# Patient Record
Sex: FEMALE | Race: BLACK OR AFRICAN AMERICAN | NOT HISPANIC OR LATINO | URBAN - METROPOLITAN AREA
[De-identification: names, ages, dates, MRNs, and addresses within clinical notes are randomized per-mention and may not be internally consistent; named-entity substitution may affect disease eponyms.]

---

## 2023-06-09 LAB — URINE CULTURE: NORMAL

## 2023-06-13 LAB
CHLAMYDIA TRACH., AMPLIFIED: NEGATIVE
N. GONORRHEA, AMPLIFIED: NEGATIVE

## 2023-06-23 LAB
HIV 1/ 2 AG/AB SCREEN: NONREACTIVE
SYPHILIS TOTAL AB: NONREACTIVE

## 2023-12-05 ENCOUNTER — OFFICE VISIT (OUTPATIENT)
Dept: OBSTETRICS AND GYNECOLOGY | Facility: CLINIC | Age: 33
End: 2023-12-05
Payer: COMMERCIAL

## 2023-12-05 ENCOUNTER — LAB (OUTPATIENT)
Dept: LAB | Facility: LAB | Age: 33
End: 2023-12-05
Payer: COMMERCIAL

## 2023-12-05 VITALS
HEIGHT: 59 IN | HEART RATE: 86 BPM | WEIGHT: 213.9 LBS | BODY MASS INDEX: 43.12 KG/M2 | DIASTOLIC BLOOD PRESSURE: 82 MMHG | SYSTOLIC BLOOD PRESSURE: 130 MMHG

## 2023-12-05 DIAGNOSIS — D06.9 CIN III (CERVICAL INTRAEPITHELIAL NEOPLASIA III): Primary | ICD-10-CM

## 2023-12-05 DIAGNOSIS — Z30.015 ENCOUNTER FOR INITIAL PRESCRIPTION OF VAGINAL RING HORMONAL CONTRACEPTIVE: ICD-10-CM

## 2023-12-05 DIAGNOSIS — R30.0 DYSURIA: ICD-10-CM

## 2023-12-05 DIAGNOSIS — Z11.3 ROUTINE SCREENING FOR STI (SEXUALLY TRANSMITTED INFECTION): ICD-10-CM

## 2023-12-05 DIAGNOSIS — R31.9 HEMATURIA, UNSPECIFIED TYPE: ICD-10-CM

## 2023-12-05 DIAGNOSIS — N88.9 LESION OF CERVIX: ICD-10-CM

## 2023-12-05 LAB
HBV SURFACE AG SERPL QL IA: NONREACTIVE
HCV AB SER QL: NONREACTIVE
HIV 1+2 AB+HIV1 P24 AG SERPL QL IA: NONREACTIVE
POC APPEARANCE, URINE: CLEAR
POC BILIRUBIN, URINE: NEGATIVE
POC BLOOD, URINE: ABNORMAL
POC COLOR, URINE: ABNORMAL
POC GLUCOSE, URINE: NEGATIVE MG/DL
POC KETONES, URINE: NEGATIVE MG/DL
POC LEUKOCYTES, URINE: ABNORMAL
POC NITRITE,URINE: NEGATIVE
POC PH, URINE: 6 PH
POC PROTEIN, URINE: ABNORMAL MG/DL
POC SPECIFIC GRAVITY, URINE: >=1.03
POC UROBILINOGEN, URINE: 1 EU/DL
T PALLIDUM AB SER QL: NONREACTIVE

## 2023-12-05 PROCEDURE — 87529 HSV DNA AMP PROBE: CPT | Performed by: NURSE PRACTITIONER

## 2023-12-05 PROCEDURE — 81003 URINALYSIS AUTO W/O SCOPE: CPT | Performed by: NURSE PRACTITIONER

## 2023-12-05 PROCEDURE — 87086 URINE CULTURE/COLONY COUNT: CPT | Performed by: NURSE PRACTITIONER

## 2023-12-05 PROCEDURE — 87661 TRICHOMONAS VAGINALIS AMPLIF: CPT | Mod: 59 | Performed by: NURSE PRACTITIONER

## 2023-12-05 PROCEDURE — 99214 OFFICE O/P EST MOD 30 MIN: CPT | Performed by: NURSE PRACTITIONER

## 2023-12-05 PROCEDURE — 87340 HEPATITIS B SURFACE AG IA: CPT

## 2023-12-05 PROCEDURE — 86803 HEPATITIS C AB TEST: CPT

## 2023-12-05 PROCEDURE — 1036F TOBACCO NON-USER: CPT | Performed by: NURSE PRACTITIONER

## 2023-12-05 PROCEDURE — 36415 COLL VENOUS BLD VENIPUNCTURE: CPT

## 2023-12-05 PROCEDURE — 86780 TREPONEMA PALLIDUM: CPT

## 2023-12-05 PROCEDURE — 88175 CYTOPATH C/V AUTO FLUID REDO: CPT | Mod: TC,GCY | Performed by: NURSE PRACTITIONER

## 2023-12-05 PROCEDURE — 87800 DETECT AGNT MULT DNA DIREC: CPT | Performed by: NURSE PRACTITIONER

## 2023-12-05 PROCEDURE — 87624 HPV HI-RISK TYP POOLED RSLT: CPT | Mod: 59 | Performed by: NURSE PRACTITIONER

## 2023-12-05 PROCEDURE — 87389 HIV-1 AG W/HIV-1&-2 AB AG IA: CPT

## 2023-12-05 RX ORDER — PHENTERMINE HYDROCHLORIDE 37.5 MG/1
37.5 TABLET ORAL DAILY
COMMUNITY
Start: 2023-09-21 | End: 2024-03-11 | Stop reason: WASHOUT

## 2023-12-05 RX ORDER — ETONOGESTREL AND ETHINYL ESTRADIOL VAGINAL RING .015; .12 MG/D; MG/D
1 RING VAGINAL
Qty: 3 EACH | Refills: 3 | Status: SHIPPED | OUTPATIENT
Start: 2023-12-05 | End: 2024-06-05 | Stop reason: SDUPTHER

## 2023-12-05 ASSESSMENT — ENCOUNTER SYMPTOMS
GASTROINTESTINAL NEGATIVE: 1
ENDOCRINE NEGATIVE: 0
ALLERGIC/IMMUNOLOGIC NEGATIVE: 1
HEMATOLOGIC/LYMPHATIC NEGATIVE: 0
RESPIRATORY NEGATIVE: 0
LOSS OF SENSATION IN FEET: 0
DEPRESSION: 0
PSYCHIATRIC NEGATIVE: 0
NEUROLOGICAL NEGATIVE: 0
CONSTITUTIONAL NEGATIVE: 0
MUSCULOSKELETAL NEGATIVE: 0
OCCASIONAL FEELINGS OF UNSTEADINESS: 0
EYES NEGATIVE: 0
CARDIOVASCULAR NEGATIVE: 0

## 2023-12-05 ASSESSMENT — COLUMBIA-SUICIDE SEVERITY RATING SCALE - C-SSRS
2. HAVE YOU ACTUALLY HAD ANY THOUGHTS OF KILLING YOURSELF?: NO
1. IN THE PAST MONTH, HAVE YOU WISHED YOU WERE DEAD OR WISHED YOU COULD GO TO SLEEP AND NOT WAKE UP?: NO
6. HAVE YOU EVER DONE ANYTHING, STARTED TO DO ANYTHING, OR PREPARED TO DO ANYTHING TO END YOUR LIFE?: NO

## 2023-12-05 ASSESSMENT — PATIENT HEALTH QUESTIONNAIRE - PHQ9
1. LITTLE INTEREST OR PLEASURE IN DOING THINGS: NOT AT ALL
2. FEELING DOWN, DEPRESSED OR HOPELESS: NOT AT ALL
SUM OF ALL RESPONSES TO PHQ9 QUESTIONS 1 AND 2: 0

## 2023-12-05 ASSESSMENT — PAIN SCALES - GENERAL: PAINLEVEL: 0-NO PAIN

## 2023-12-05 NOTE — PROGRESS NOTES
Lorraine is a 34yo here today for 6 month follow up after LEEP    BHUPINDER 3    She is also having multiple other issues  Feels like she has a UTI, some pain  Would like STI testing    Physical Exam  Constitutional:       Appearance: Normal appearance. She is obese.   Genitourinary:      Genitourinary Comments: Multiple shallow ulcers on cervix      Vulva exam comments: Normal.      Cervical lesion present.         Neurological:      Mental Status: She is alert.   Psychiatric:         Mood and Affect: Mood normal.         Behavior: Behavior normal.         Thought Content: Thought content normal.         Judgment: Judgment normal.          Diagnoses and all orders for this visit:  BHUPINDER III (cervical intraepithelial neoplasia III)  -     THINPREP PAP  Lesion of cervix  -     HSV PCR, Skin/Mucosa  Dysuria  -     POC Urine Dip  Encounter for initial prescription of vaginal ring hormonal contraceptive  -     etonogestreL-ethinyl estradioL (Nuvaring) 0.12-0.015 mg/24 hr vaginal ring; Insert 1 each into the vagina every 28 (twenty-eight) days. Insert vaginal ring for 3 weeks, then remove for 1 week.  Hematuria, unspecified type  -     Urine culture  Routine screening for STI (sexually transmitted infection)  -     Hepatitis B Surface Antigen; Future  -     Hepatitis C Antibody; Future  -     HIV 1/2 Antigen/Antibody Screen with Reflex to Confirmation; Future  -     Syphilis Screen with Reflex; Future

## 2023-12-06 LAB
HSV1 DNA SKIN QL NAA+PROBE: NOT DETECTED
HSV2 DNA SKIN QL NAA+PROBE: NOT DETECTED

## 2023-12-07 DIAGNOSIS — N30.01 ACUTE CYSTITIS WITH HEMATURIA: Primary | ICD-10-CM

## 2023-12-07 LAB
C TRACH RRNA SPEC QL NAA+PROBE: NEGATIVE
N GONORRHOEA DNA SPEC QL PROBE+SIG AMP: NEGATIVE
T VAGINALIS RRNA SPEC QL NAA+PROBE: NEGATIVE

## 2023-12-07 RX ORDER — NITROFURANTOIN 25; 75 MG/1; MG/1
100 CAPSULE ORAL 2 TIMES DAILY
Qty: 14 CAPSULE | Refills: 0 | Status: SHIPPED | OUTPATIENT
Start: 2023-12-07 | End: 2023-12-14

## 2023-12-08 LAB — BACTERIA UR CULT: ABNORMAL

## 2023-12-20 LAB
CYTOLOGY CMNT CVX/VAG CYTO-IMP: NORMAL
HPV HR 12 DNA GENITAL QL NAA+PROBE: POSITIVE
HPV HR GENOTYPES PNL CVX NAA+PROBE: POSITIVE
HPV16 DNA SPEC QL NAA+PROBE: NEGATIVE
HPV18 DNA SPEC QL NAA+PROBE: NEGATIVE
LAB AP HPV GENOTYPE QUESTION: YES
LAB AP HPV HR: NORMAL
LAB AP PAP ADDITIONAL TESTS: NORMAL
LAB AP PREVIOUS ABNORMAL HISTORY: NORMAL
LAB AP TREATMENT HISTORY: NORMAL
LABORATORY COMMENT REPORT: NORMAL
LMP START DATE: NORMAL
PATH REPORT.TOTAL CANCER: NORMAL

## 2024-03-11 ENCOUNTER — OFFICE VISIT (OUTPATIENT)
Dept: OBSTETRICS AND GYNECOLOGY | Facility: CLINIC | Age: 34
End: 2024-03-11
Payer: COMMERCIAL

## 2024-03-11 VITALS
HEIGHT: 59 IN | BODY MASS INDEX: 43.67 KG/M2 | DIASTOLIC BLOOD PRESSURE: 86 MMHG | HEART RATE: 87 BPM | WEIGHT: 216.6 LBS | SYSTOLIC BLOOD PRESSURE: 137 MMHG

## 2024-03-11 DIAGNOSIS — R39.9 UTI SYMPTOMS: Primary | ICD-10-CM

## 2024-03-11 LAB
POC APPEARANCE, URINE: CLEAR
POC BILIRUBIN, URINE: NEGATIVE
POC BLOOD, URINE: ABNORMAL
POC COLOR, URINE: YELLOW
POC GLUCOSE, URINE: NEGATIVE MG/DL
POC KETONES, URINE: NEGATIVE MG/DL
POC LEUKOCYTES, URINE: NEGATIVE
POC NITRITE,URINE: NEGATIVE
POC PH, URINE: 5.5 PH
POC PROTEIN, URINE: NEGATIVE MG/DL
POC SPECIFIC GRAVITY, URINE: >=1.03
POC UROBILINOGEN, URINE: 0.2 EU/DL

## 2024-03-11 PROCEDURE — 99213 OFFICE O/P EST LOW 20 MIN: CPT | Performed by: OBSTETRICS & GYNECOLOGY

## 2024-03-11 PROCEDURE — 81003 URINALYSIS AUTO W/O SCOPE: CPT | Performed by: OBSTETRICS & GYNECOLOGY

## 2024-03-11 PROCEDURE — 87086 URINE CULTURE/COLONY COUNT: CPT | Performed by: OBSTETRICS & GYNECOLOGY

## 2024-03-11 PROCEDURE — 1036F TOBACCO NON-USER: CPT | Performed by: OBSTETRICS & GYNECOLOGY

## 2024-03-11 PROCEDURE — 99213 OFFICE O/P EST LOW 20 MIN: CPT | Mod: TH | Performed by: OBSTETRICS & GYNECOLOGY

## 2024-03-11 RX ORDER — CEPHALEXIN 500 MG/1
500 CAPSULE ORAL 2 TIMES DAILY
Qty: 14 CAPSULE | Refills: 0 | Status: SHIPPED | OUTPATIENT
Start: 2024-03-11 | End: 2024-03-18

## 2024-03-11 ASSESSMENT — ENCOUNTER SYMPTOMS
CONSTITUTIONAL NEGATIVE: 1
GASTROINTESTINAL NEGATIVE: 1

## 2024-03-11 ASSESSMENT — PAIN SCALES - GENERAL: PAINLEVEL: 0-NO PAIN

## 2024-03-11 NOTE — PROGRESS NOTES
"Assessment   UA in office indicative of UTI (2+ blood).  Urine culture with susceptibility ordered.  Pt will complete Augmention for throat sx but if it is the same organism as from Dec 2023, it may not respond to Augmentin.  Anticipate she will have better relief with Keflex (sent empirically)  Pt to fu in 2024 for repeat cotest with ECC for LEEP with positive margins.    Ora Correa MD    Subjective   32 YO  here for UTI sx since end of February.  Feels like she has to pee all the time and it's just a little each time.  Urine is dark and smells.   No dysuria.  She has been on Augmentin since 3/6 for a sore throat (empiric tx for strep). No difference to her sx.    Pt feels like it feels similar to the UTI she had in December (amp resistant Klebsiella).  She tried Macrobid but it made her feel very sick so she didn't finish.  She did feel that sx improved for a short time and didn't recur until February.    Pt also due for repeat Pap with cotest in 2024 as she had LEEP 2023 with positive margins in 2023.    Objective   /86   Pulse 87   Ht 1.499 m (4' 11\")   Wt 98.2 kg (216 lb 9.6 oz)   LMP 2024 (Exact Date)   BMI 43.75 kg/m²      General:   Alert and oriented, in no acute distress   Neck: Supple. No visible thyromegaly.    Psych Normal affect. Normal mood.      UA performed today in office.  "

## 2024-03-13 LAB — BACTERIA UR CULT: NORMAL

## 2024-06-05 ENCOUNTER — OFFICE VISIT (OUTPATIENT)
Dept: OBSTETRICS AND GYNECOLOGY | Facility: CLINIC | Age: 34
End: 2024-06-05
Payer: COMMERCIAL

## 2024-06-05 VITALS
HEART RATE: 81 BPM | WEIGHT: 212.4 LBS | SYSTOLIC BLOOD PRESSURE: 129 MMHG | DIASTOLIC BLOOD PRESSURE: 84 MMHG | HEIGHT: 60 IN | BODY MASS INDEX: 41.7 KG/M2

## 2024-06-05 DIAGNOSIS — N87.9 CERVICAL DYSPLASIA: ICD-10-CM

## 2024-06-05 DIAGNOSIS — Z01.411 ENCOUNTER FOR WELL WOMAN EXAM WITH ABNORMAL FINDINGS: Primary | ICD-10-CM

## 2024-06-05 DIAGNOSIS — Z13.1 DIABETES MELLITUS SCREENING: ICD-10-CM

## 2024-06-05 DIAGNOSIS — Z30.015 ENCOUNTER FOR INITIAL PRESCRIPTION OF VAGINAL RING HORMONAL CONTRACEPTIVE: ICD-10-CM

## 2024-06-05 DIAGNOSIS — Z11.3 ROUTINE SCREENING FOR STI (SEXUALLY TRANSMITTED INFECTION): ICD-10-CM

## 2024-06-05 DIAGNOSIS — Z31.62 ENCOUNTER FOR FERTILITY PRESERVATION COUNSELING: ICD-10-CM

## 2024-06-05 PROCEDURE — 87661 TRICHOMONAS VAGINALIS AMPLIF: CPT | Performed by: OBSTETRICS & GYNECOLOGY

## 2024-06-05 PROCEDURE — 99395 PREV VISIT EST AGE 18-39: CPT | Performed by: OBSTETRICS & GYNECOLOGY

## 2024-06-05 PROCEDURE — 1036F TOBACCO NON-USER: CPT | Performed by: OBSTETRICS & GYNECOLOGY

## 2024-06-05 PROCEDURE — 87491 CHLMYD TRACH DNA AMP PROBE: CPT | Performed by: OBSTETRICS & GYNECOLOGY

## 2024-06-05 RX ORDER — ETONOGESTREL AND ETHINYL ESTRADIOL VAGINAL RING .015; .12 MG/D; MG/D
RING VAGINAL
Qty: 4 EACH | Refills: 4 | Status: SHIPPED | OUTPATIENT
Start: 2024-06-05

## 2024-06-05 ASSESSMENT — ENCOUNTER SYMPTOMS
CARDIOVASCULAR NEGATIVE: 0
GASTROINTESTINAL NEGATIVE: 0
MUSCULOSKELETAL NEGATIVE: 0
EYES NEGATIVE: 0
PSYCHIATRIC NEGATIVE: 0
DEPRESSION: 0
CONSTITUTIONAL NEGATIVE: 0
RESPIRATORY NEGATIVE: 0
NEUROLOGICAL NEGATIVE: 0
ENDOCRINE NEGATIVE: 0
HEMATOLOGIC/LYMPHATIC NEGATIVE: 0
ALLERGIC/IMMUNOLOGIC NEGATIVE: 0
OCCASIONAL FEELINGS OF UNSTEADINESS: 0
LOSS OF SENSATION IN FEET: 0

## 2024-06-05 ASSESSMENT — COLUMBIA-SUICIDE SEVERITY RATING SCALE - C-SSRS
6. HAVE YOU EVER DONE ANYTHING, STARTED TO DO ANYTHING, OR PREPARED TO DO ANYTHING TO END YOUR LIFE?: NO
1. IN THE PAST MONTH, HAVE YOU WISHED YOU WERE DEAD OR WISHED YOU COULD GO TO SLEEP AND NOT WAKE UP?: NO
2. HAVE YOU ACTUALLY HAD ANY THOUGHTS OF KILLING YOURSELF?: NO

## 2024-06-05 ASSESSMENT — PAIN SCALES - GENERAL: PAINLEVEL: 0-NO PAIN

## 2024-06-05 ASSESSMENT — PATIENT HEALTH QUESTIONNAIRE - PHQ9
2. FEELING DOWN, DEPRESSED OR HOPELESS: NOT AT ALL
SUM OF ALL RESPONSES TO PHQ9 QUESTIONS 1 AND 2: 0
1. LITTLE INTEREST OR PLEASURE IN DOING THINGS: NOT AT ALL

## 2024-06-05 NOTE — PROGRESS NOTES
Assessment   PLAN  Thank you for coming to your annual exam. We discussed healthy lifestyle, well woman screening, and other diagnoses listed below.    Lorraine was seen today for annual exam.  Diagnoses and all orders for this visit:  Encounter for fertility preservation counseling (Primary)  -     Referral to Reproductive Endocrinology; Future  Encounter for initial prescription of vaginal ring hormonal contraceptive  -     etonogestreL-ethinyl estradioL (Nuvaring) 0.12-0.015 mg/24 hr vaginal ring; Insert vaginal ring every 3 weeks to suppress menstruation.  Cervical dysplasia  -     THINPREP PAP  Diabetes mellitus screening  -     Hemoglobin A1C; Future  Routine screening for STI (sexually transmitted infection)  -     Hepatitis B Surface Antigen; Future  -     Hepatitis C Antibody; Future  -     HIV 1/2 Antigen/Antibody Screen with Reflex to Confirmation; Future  -     Syphilis Screen with Reflex; Future    Please return for your next visit in 1 year.    Ora Correa MD    Subjective     Lorraine Patterson is a 34 y.o. female who is here for a routine exam.   PCP = Breana Shah MD    APE Concerns: none    Other Concerns:   Interested in menstrual suppression  FU cervical dysplasia  Interested in discussing fertility preservation as she does want to have children in the future. Has been with current partner x 2 months and he has a genetic disorder.    ObHx:   17-21w loss, s/o cervical insufficiency  TAB x 1     GynHx:  Menarche: 11  Menstrual Pattern: reg menses without menorrhagia or dysmenorrhea   STIs: hx remote GC/CT/trich, desires screening  Sexual Activity: men, monogamous, no complaints  Contraception: Nuvaring cyclically + condoms    Pap Hx:  2023 - LEEP BHUPINDER 3 with positive margins 6-9 o'clock  Dec 2023 - neg cytology, +HRHPV  Repeat cotest now    Preventative:  Last mammogram: due age 39  Last Colonoscopy: due age 44  DM Screening: elevated gluc on CMP in 2024, recommend A1C  now  DEXA: due age 64  HPV vaccination: received  Exercise: 2-3 times a week  Diet: no restrictions  Seat Belt Use: always    SoHx:  Living Situation: lives independently  School/Employment: behavioral health specialist at [employer]  Substance: No T/D. EtOH social.  Abuse: denies  Depression Screen: negative    Past Medical History:   Diagnosis Date    Cervical dysplasia     cin3 sp LEEP June 2023    Seasonal allergies     Situational anxiety     related to 2nd trimester loss, sees therapist     Past Surgical History:   Procedure Laterality Date    CERVICAL BIOPSY  W/ LOOP ELECTRODE EXCISION  06/2023    CIN3     No family history on file.    Objective   /84   Pulse 81   Ht 1.524 m (5')   Wt 96.3 kg (212 lb 6.4 oz)   LMP 05/08/2024 (Exact Date)   BMI 41.48 kg/m²      General:   Alert and oriented, in no acute distress   Neck: Supple. No visible thyromegaly.    Breast/Axilla: Normal to palpation bilaterally without masses, skin changes, or nipple discharge.    Abdomen: Soft, non-tender, without masses or organomegaly   Vulva: Normal architecture without erythema, masses, or lesions.    Vagina: Normal mucosa without lesions, masses, or atrophy. No abnormal vaginal discharge.    Cervix: Normal without masses, lesions, or signs of cervicitis.    Uterus: Normal mobile, non-enlarged uterus    Adnexa: Normal without masses or lesions   Pelvic Floor No POP noted. No high tone pelvic floor    Psych Normal affect. Normal mood.

## 2024-06-12 ENCOUNTER — APPOINTMENT (OUTPATIENT)
Dept: OBSTETRICS AND GYNECOLOGY | Facility: CLINIC | Age: 34
End: 2024-06-12
Payer: COMMERCIAL

## 2024-06-17 ENCOUNTER — APPOINTMENT (OUTPATIENT)
Dept: OBSTETRICS AND GYNECOLOGY | Facility: CLINIC | Age: 34
End: 2024-06-17
Payer: COMMERCIAL

## 2024-06-18 LAB
CYTOLOGY CMNT CVX/VAG CYTO-IMP: NORMAL
HPV HR 12 DNA GENITAL QL NAA+PROBE: POSITIVE
HPV HR GENOTYPES PNL CVX NAA+PROBE: POSITIVE
HPV16 DNA SPEC QL NAA+PROBE: NEGATIVE
HPV18 DNA SPEC QL NAA+PROBE: NEGATIVE
LAB AP HPV GENOTYPE QUESTION: YES
LAB AP HPV HR: NORMAL
LAB AP PAP ADDITIONAL TESTS: NORMAL
LABORATORY COMMENT REPORT: NORMAL
LMP START DATE: NORMAL
PATH REPORT.TOTAL CANCER: NORMAL

## 2024-06-24 ENCOUNTER — LAB (OUTPATIENT)
Dept: LAB | Facility: LAB | Age: 34
End: 2024-06-24
Payer: COMMERCIAL

## 2024-06-24 DIAGNOSIS — Z11.3 ROUTINE SCREENING FOR STI (SEXUALLY TRANSMITTED INFECTION): ICD-10-CM

## 2024-06-24 DIAGNOSIS — Z13.1 DIABETES MELLITUS SCREENING: ICD-10-CM

## 2024-06-24 LAB
EST. AVERAGE GLUCOSE BLD GHB EST-MCNC: 111 MG/DL
HBA1C MFR BLD: 5.5 %
HBV SURFACE AG SERPL QL IA: NONREACTIVE
HCV AB SER QL: NONREACTIVE
HIV 1+2 AB+HIV1 P24 AG SERPL QL IA: NONREACTIVE
TREPONEMA PALLIDUM IGG+IGM AB [PRESENCE] IN SERUM OR PLASMA BY IMMUNOASSAY: NONREACTIVE

## 2024-06-24 PROCEDURE — 87340 HEPATITIS B SURFACE AG IA: CPT

## 2024-06-24 PROCEDURE — 86780 TREPONEMA PALLIDUM: CPT

## 2024-06-24 PROCEDURE — 83036 HEMOGLOBIN GLYCOSYLATED A1C: CPT

## 2024-06-24 PROCEDURE — 87389 HIV-1 AG W/HIV-1&-2 AB AG IA: CPT

## 2024-06-24 PROCEDURE — 86803 HEPATITIS C AB TEST: CPT

## 2024-06-24 PROCEDURE — 36415 COLL VENOUS BLD VENIPUNCTURE: CPT

## 2024-08-21 ENCOUNTER — PROCEDURE VISIT (OUTPATIENT)
Dept: OBSTETRICS AND GYNECOLOGY | Facility: CLINIC | Age: 34
End: 2024-08-21
Payer: COMMERCIAL

## 2024-08-21 VITALS
SYSTOLIC BLOOD PRESSURE: 138 MMHG | BODY MASS INDEX: 39.34 KG/M2 | HEIGHT: 60 IN | DIASTOLIC BLOOD PRESSURE: 86 MMHG | HEART RATE: 86 BPM | WEIGHT: 200.4 LBS

## 2024-08-21 DIAGNOSIS — R87.810 CERVICAL HIGH RISK HPV (HUMAN PAPILLOMAVIRUS) TEST POSITIVE: Primary | ICD-10-CM

## 2024-08-21 LAB — PREGNANCY TEST URINE, POC: NEGATIVE

## 2024-08-21 PROCEDURE — 81025 URINE PREGNANCY TEST: CPT | Performed by: OBSTETRICS & GYNECOLOGY

## 2024-08-21 PROCEDURE — 57454 BX/CURETT OF CERVIX W/SCOPE: CPT | Performed by: OBSTETRICS & GYNECOLOGY

## 2024-08-21 ASSESSMENT — ENCOUNTER SYMPTOMS
MUSCULOSKELETAL NEGATIVE: 0
PSYCHIATRIC NEGATIVE: 0
ALLERGIC/IMMUNOLOGIC NEGATIVE: 0
CONSTITUTIONAL NEGATIVE: 0
HEMATOLOGIC/LYMPHATIC NEGATIVE: 0
CARDIOVASCULAR NEGATIVE: 0
ENDOCRINE NEGATIVE: 0
GASTROINTESTINAL NEGATIVE: 0
NEUROLOGICAL NEGATIVE: 0
EYES NEGATIVE: 0
RESPIRATORY NEGATIVE: 0

## 2024-08-21 ASSESSMENT — PAIN SCALES - GENERAL: PAINLEVEL: 0-NO PAIN

## 2024-08-21 NOTE — PROGRESS NOTES
Assessment   Colposcopic impression: CIN1 or benign  Specimen: ECC  Office will call with results and FU plan    Ora Correa MD     Subjective   34 y.o.  here for colposcopy.    GynHx:  Menarche: 11  Menstrual Pattern: reg menses without menorrhagia or dysmenorrhea   STIs: hx remote GC/CT/trich, desires screening  Sexual Activity: men, monogamous, no complaints  Contraception: Nuvaring cyclically + condoms     Pap Hx:  2023 - LEEP BHUPINDER 3 with positive margins 6-9 o'clock  Dec 2023 - neg cytology, +HRHPV  2024 - neg cytology, +HRHPV    RF for dysplasia: h/o CIN3. She has received the Gardasil vaccine.    Objective   Blood pressure 138/86, pulse 86, height 1.524 m (5'), weight 90.9 kg (200 lb 6.4 oz).  UPT: neg    COLPOSCOPY PROCEDURE  Consent obtained  Speculum inserted   Evaluation of vulva and vagina: normal  Gross abnormalities: none  SCJ Fully Visualized: Yes  Acetowhite Changes: none  Procedures: ECC  Patient tolerated the procedure well  There were no complications

## 2024-08-27 ENCOUNTER — APPOINTMENT (OUTPATIENT)
Dept: RADIOLOGY | Facility: HOSPITAL | Age: 34
End: 2024-08-27
Payer: COMMERCIAL

## 2024-08-27 ENCOUNTER — HOSPITAL ENCOUNTER (EMERGENCY)
Facility: HOSPITAL | Age: 34
Discharge: HOME | End: 2024-08-27
Payer: COMMERCIAL

## 2024-08-27 VITALS
TEMPERATURE: 97.4 F | SYSTOLIC BLOOD PRESSURE: 123 MMHG | HEART RATE: 94 BPM | OXYGEN SATURATION: 99 % | RESPIRATION RATE: 16 BRPM | BODY MASS INDEX: 34.15 KG/M2 | DIASTOLIC BLOOD PRESSURE: 78 MMHG | WEIGHT: 200 LBS | HEIGHT: 64 IN

## 2024-08-27 DIAGNOSIS — N13.30 HYDRONEPHROSIS, UNSPECIFIED HYDRONEPHROSIS TYPE: ICD-10-CM

## 2024-08-27 DIAGNOSIS — N12 PYELONEPHRITIS: Primary | ICD-10-CM

## 2024-08-27 LAB
ALBUMIN SERPL BCP-MCNC: 3.9 G/DL (ref 3.4–5)
ALP SERPL-CCNC: 64 U/L (ref 33–110)
ALT SERPL W P-5'-P-CCNC: 22 U/L (ref 7–45)
ANION GAP SERPL CALC-SCNC: 18 MMOL/L (ref 10–20)
APPEARANCE UR: ABNORMAL
AST SERPL W P-5'-P-CCNC: 16 U/L (ref 9–39)
BASOPHILS # BLD AUTO: 0.03 X10*3/UL (ref 0–0.1)
BASOPHILS NFR BLD AUTO: 0.2 %
BILIRUB SERPL-MCNC: 0.7 MG/DL (ref 0–1.2)
BILIRUB UR STRIP.AUTO-MCNC: NEGATIVE MG/DL
BUN SERPL-MCNC: 6 MG/DL (ref 6–23)
CALCIUM SERPL-MCNC: 9.7 MG/DL (ref 8.6–10.6)
CHLORIDE SERPL-SCNC: 101 MMOL/L (ref 98–107)
CO2 SERPL-SCNC: 21 MMOL/L (ref 21–32)
COLOR UR: YELLOW
CREAT SERPL-MCNC: 0.7 MG/DL (ref 0.5–1.05)
EGFRCR SERPLBLD CKD-EPI 2021: >90 ML/MIN/1.73M*2
EOSINOPHIL # BLD AUTO: 0 X10*3/UL (ref 0–0.7)
EOSINOPHIL NFR BLD AUTO: 0 %
ERYTHROCYTE [DISTWIDTH] IN BLOOD BY AUTOMATED COUNT: 13 % (ref 11.5–14.5)
GLUCOSE SERPL-MCNC: 104 MG/DL (ref 74–99)
GLUCOSE UR STRIP.AUTO-MCNC: NORMAL MG/DL
HCT VFR BLD AUTO: 39.9 % (ref 36–46)
HGB BLD-MCNC: 13.5 G/DL (ref 12–16)
IMM GRANULOCYTES # BLD AUTO: 0.07 X10*3/UL (ref 0–0.7)
IMM GRANULOCYTES NFR BLD AUTO: 0.4 % (ref 0–0.9)
KETONES UR STRIP.AUTO-MCNC: ABNORMAL MG/DL
LEUKOCYTE ESTERASE UR QL STRIP.AUTO: ABNORMAL
LIPASE SERPL-CCNC: 22 U/L (ref 9–82)
LYMPHOCYTES # BLD AUTO: 1.34 X10*3/UL (ref 1.2–4.8)
LYMPHOCYTES NFR BLD AUTO: 7.9 %
MCH RBC QN AUTO: 28.1 PG (ref 26–34)
MCHC RBC AUTO-ENTMCNC: 33.8 G/DL (ref 32–36)
MCV RBC AUTO: 83 FL (ref 80–100)
MONOCYTES # BLD AUTO: 1.6 X10*3/UL (ref 0.1–1)
MONOCYTES NFR BLD AUTO: 9.4 %
MUCOUS THREADS #/AREA URNS AUTO: ABNORMAL /LPF
NEUTROPHILS # BLD AUTO: 13.92 X10*3/UL (ref 1.2–7.7)
NEUTROPHILS NFR BLD AUTO: 82.1 %
NITRITE UR QL STRIP.AUTO: NEGATIVE
NRBC BLD-RTO: 0 /100 WBCS (ref 0–0)
PH UR STRIP.AUTO: 5.5 [PH]
PLATELET # BLD AUTO: 390 X10*3/UL (ref 150–450)
POTASSIUM SERPL-SCNC: 3.9 MMOL/L (ref 3.5–5.3)
PREGNANCY TEST URINE, POC: NEGATIVE
PROT SERPL-MCNC: 8.2 G/DL (ref 6.4–8.2)
PROT UR STRIP.AUTO-MCNC: ABNORMAL MG/DL
RBC # BLD AUTO: 4.81 X10*6/UL (ref 4–5.2)
RBC # UR STRIP.AUTO: ABNORMAL /UL
RBC #/AREA URNS AUTO: >20 /HPF
SODIUM SERPL-SCNC: 136 MMOL/L (ref 136–145)
SP GR UR STRIP.AUTO: 1.03
SQUAMOUS #/AREA URNS AUTO: ABNORMAL /HPF
UROBILINOGEN UR STRIP.AUTO-MCNC: NORMAL MG/DL
WBC # BLD AUTO: 17 X10*3/UL (ref 4.4–11.3)
WBC #/AREA URNS AUTO: >50 /HPF
WBC CLUMPS #/AREA URNS AUTO: ABNORMAL /HPF

## 2024-08-27 PROCEDURE — 99284 EMERGENCY DEPT VISIT MOD MDM: CPT | Mod: 25

## 2024-08-27 PROCEDURE — 87186 SC STD MICRODIL/AGAR DIL: CPT

## 2024-08-27 PROCEDURE — 83690 ASSAY OF LIPASE: CPT

## 2024-08-27 PROCEDURE — 81025 URINE PREGNANCY TEST: CPT

## 2024-08-27 PROCEDURE — 85025 COMPLETE CBC W/AUTO DIFF WBC: CPT

## 2024-08-27 PROCEDURE — 2500000004 HC RX 250 GENERAL PHARMACY W/ HCPCS (ALT 636 FOR OP/ED)

## 2024-08-27 PROCEDURE — 96375 TX/PRO/DX INJ NEW DRUG ADDON: CPT

## 2024-08-27 PROCEDURE — 81001 URINALYSIS AUTO W/SCOPE: CPT

## 2024-08-27 PROCEDURE — 74177 CT ABD & PELVIS W/CONTRAST: CPT | Performed by: RADIOLOGY

## 2024-08-27 PROCEDURE — 2550000001 HC RX 255 CONTRASTS

## 2024-08-27 PROCEDURE — 74177 CT ABD & PELVIS W/CONTRAST: CPT

## 2024-08-27 PROCEDURE — 80053 COMPREHEN METABOLIC PANEL: CPT

## 2024-08-27 PROCEDURE — 96374 THER/PROPH/DIAG INJ IV PUSH: CPT | Mod: 59

## 2024-08-27 PROCEDURE — 36415 COLL VENOUS BLD VENIPUNCTURE: CPT

## 2024-08-27 RX ORDER — METOCLOPRAMIDE HYDROCHLORIDE 5 MG/ML
10 INJECTION INTRAMUSCULAR; INTRAVENOUS ONCE
Status: COMPLETED | OUTPATIENT
Start: 2024-08-27 | End: 2024-08-27

## 2024-08-27 RX ORDER — METOCLOPRAMIDE 10 MG/1
10 TABLET ORAL EVERY 8 HOURS PRN
Qty: 20 TABLET | Refills: 0 | Status: SHIPPED | OUTPATIENT
Start: 2024-08-27

## 2024-08-27 RX ORDER — KETOROLAC TROMETHAMINE 15 MG/ML
15 INJECTION, SOLUTION INTRAMUSCULAR; INTRAVENOUS ONCE
Status: COMPLETED | OUTPATIENT
Start: 2024-08-27 | End: 2024-08-27

## 2024-08-27 RX ORDER — CEFTRIAXONE 1 G/50ML
1 INJECTION, SOLUTION INTRAVENOUS ONCE
Status: COMPLETED | OUTPATIENT
Start: 2024-08-27 | End: 2024-08-27

## 2024-08-27 RX ORDER — METOCLOPRAMIDE HYDROCHLORIDE 5 MG/ML
INJECTION INTRAMUSCULAR; INTRAVENOUS
Status: COMPLETED
Start: 2024-08-27 | End: 2024-08-27

## 2024-08-27 RX ORDER — KETOROLAC TROMETHAMINE 10 MG/1
10 TABLET, FILM COATED ORAL EVERY 8 HOURS PRN
Qty: 15 TABLET | Refills: 0 | Status: SHIPPED | OUTPATIENT
Start: 2024-08-27

## 2024-08-27 RX ORDER — SULFAMETHOXAZOLE AND TRIMETHOPRIM 800; 160 MG/1; MG/1
1 TABLET ORAL 2 TIMES DAILY
Qty: 26 TABLET | Refills: 0 | Status: SHIPPED | OUTPATIENT
Start: 2024-08-27 | End: 2024-09-09

## 2024-08-27 RX ORDER — ONDANSETRON HYDROCHLORIDE 2 MG/ML
4 INJECTION, SOLUTION INTRAVENOUS ONCE
Status: COMPLETED | OUTPATIENT
Start: 2024-08-27 | End: 2024-08-27

## 2024-08-27 ASSESSMENT — PAIN - FUNCTIONAL ASSESSMENT: PAIN_FUNCTIONAL_ASSESSMENT: 0-10

## 2024-08-27 ASSESSMENT — LIFESTYLE VARIABLES
EVER HAD A DRINK FIRST THING IN THE MORNING TO STEADY YOUR NERVES TO GET RID OF A HANGOVER: NO
TOTAL SCORE: 0
HAVE YOU EVER FELT YOU SHOULD CUT DOWN ON YOUR DRINKING: NO
EVER FELT BAD OR GUILTY ABOUT YOUR DRINKING: NO
HAVE PEOPLE ANNOYED YOU BY CRITICIZING YOUR DRINKING: NO

## 2024-08-27 ASSESSMENT — COLUMBIA-SUICIDE SEVERITY RATING SCALE - C-SSRS
1. IN THE PAST MONTH, HAVE YOU WISHED YOU WERE DEAD OR WISHED YOU COULD GO TO SLEEP AND NOT WAKE UP?: NO
6. HAVE YOU EVER DONE ANYTHING, STARTED TO DO ANYTHING, OR PREPARED TO DO ANYTHING TO END YOUR LIFE?: NO
2. HAVE YOU ACTUALLY HAD ANY THOUGHTS OF KILLING YOURSELF?: NO

## 2024-08-27 ASSESSMENT — PAIN SCALES - GENERAL: PAINLEVEL_OUTOF10: 6

## 2024-08-27 NOTE — ED PROVIDER NOTES
HPI   Chief Complaint   Patient presents with    Flank Pain       Patient is a 34 y.o female with no significant PMH presenting with left sided flank pain. She states she began vomiting 48 hours ago (8/25/24), and has not been able to keep any food or liquids down. She noticed L lower abdominal pain beginning yesterday (8/26/24) that has now progressed to her L low back. She also reports diarrhea episode yesterday, when the abdominal pain came. Diarrhea lasted for 1 hour and has now subsided. She states her mom made her go to urgent care where they got a UA on her and they told her they found bacteria in her urine to come to the ED. She is currently in 8/10 pain. Pt denies fever, chills, dysuria, polyuria, blood in stools or hematuria. Pt does admit to urinary urgency.               Patient History   Past Medical History:   Diagnosis Date    Cervical dysplasia     cin3 sp LEEP June 2023    Seasonal allergies     Situational anxiety     related to 2nd trimester loss, sees therapist     Past Surgical History:   Procedure Laterality Date    CERVICAL BIOPSY  W/ LOOP ELECTRODE EXCISION  06/2023    CIN3     No family history on file.  Social History     Tobacco Use    Smoking status: Never    Smokeless tobacco: Never   Substance Use Topics    Alcohol use: Not on file    Drug use: Not on file       Physical Exam   ED Triage Vitals [08/27/24 1652]   Temperature Heart Rate Respirations BP   36.3 °C (97.4 °F) 94 16 123/78      Pulse Ox Temp src Heart Rate Source Patient Position   99 % -- -- --      BP Location FiO2 (%)     -- --       Physical Exam  Vitals and nursing note reviewed.   Constitutional:       General: She is not in acute distress.     Appearance: Normal appearance. She is not ill-appearing.   HENT:      Head: Normocephalic and atraumatic.      Right Ear: External ear normal.      Left Ear: External ear normal.      Nose: Nose normal. No congestion or rhinorrhea.      Mouth/Throat:      Mouth: Mucous membranes  "are moist.      Pharynx: Oropharynx is clear. No oropharyngeal exudate or posterior oropharyngeal erythema.   Eyes:      Extraocular Movements: Extraocular movements intact.      Conjunctiva/sclera: Conjunctivae normal.      Pupils: Pupils are equal, round, and reactive to light.   Cardiovascular:      Rate and Rhythm: Normal rate and regular rhythm.      Heart sounds: Normal heart sounds.   Pulmonary:      Effort: No accessory muscle usage or respiratory distress.      Breath sounds: Normal breath sounds. No wheezing, rhonchi or rales.   Abdominal:      General: Abdomen is flat. Bowel sounds are normal. There is no distension.      Palpations: Abdomen is soft.      Tenderness: There is abdominal tenderness in the left lower quadrant. There is left CVA tenderness. There is no right CVA tenderness.   Musculoskeletal:         General: No swelling or deformity. Normal range of motion.      Cervical back: Normal range of motion and neck supple.      Right lower leg: No edema.      Left lower leg: No edema.   Skin:     General: Skin is warm and dry.      Capillary Refill: Capillary refill takes less than 2 seconds.   Neurological:      General: No focal deficit present.      Mental Status: She is alert and oriented to person, place, and time.      GCS: GCS eye subscore is 4. GCS verbal subscore is 5. GCS motor subscore is 6.      Cranial Nerves: Cranial nerves 2-12 are intact.      Sensory: No sensory deficit.      Motor: Motor function is intact. No weakness.   Psychiatric:         Mood and Affect: Mood and affect normal.         Speech: Speech normal.         Behavior: Behavior normal. Behavior is cooperative.           ED Course & MDM   ED Course as of 08/27/24 2153   Tue Aug 27, 2024   1802 Patient's family declined CT scan for patient as \"there is no way she has diverticulitis, this doesn't sound like diverticulitis\". I informed them that abdominal pain that starts in LLQ abdomen with associated diarrhea is " "consistent with diverticulitis. They re-iterated that this does not sound like diverticulitis bc the family member has had diverticulitis and it doesn't sound like it and she \"doesn't need a scan\" [ML]   1818 Preg Test, Ur: Negative [ML]   1844 WBC(!): 17.0 [ML]   2024 CT abdomen pelvis w IV contrast  1. Mild left hydroureteronephrosis of the proximal portion of the  collecting system. No evidence of nephroureterolithiasis. This  finding could represent a recently passed left renal calculi. Please  note that evaluation for small renal stones is limited on contrast  examination.  2. Circular ring-like structure within the vagina. Recommend clinical  correlation for intravaginal menstrual product or contraceptive  device.   [ML]      ED Course User Index  [ML] Maribell Hinkle PA-C         Diagnoses as of 08/27/24 2153   Pyelonephritis   Hydronephrosis, unspecified hydronephrosis type                 No data recorded     Louie Coma Scale Score: 15 (08/27/24 1651 : Kerry Bailey RN)                           Medical Decision Making  Patient is a 34 y.o female with no significant PMH presenting with left sided flank pain. Pt has been vomiting for 2 days now with inability to keep fluids or solids down. She is now endorsing prominent left lower quadrant abdominal pain and flank pain. She was seen at urgent care before coming to ED with bacteria seen in UA. Patient is ill- appearing and sitting uncomfortably in chair. On physical exam, she is tender to palpation to LLQ with patient admitting to diarrhea episodes one day ago, suspicion for diverticulitis. Patient also has notable L sided CVA tenderness, which is concerning for pyelonephritis or stone.  Urinalysis does show gross amounts of white blood and red blood cells concerning for infected stone.  Will proceed with CT with contrast considering this pain started in the left lower quadrant to also rule out diverticulitis.  Patient was given Toradol and Zofran for nausea " control.    CMP does have a white count of 17 but no acute anemia.  Lipase within normal limits, CMP showing no electrolyte abnormalities, DARRION or transaminitis.  Patient is not pregnant.  CT scan showing trace left hydro nephrosis without evidence of kidney stone.  Findings consistent with possible recently passed left renal stone.  I did discuss on the phone with urology and considering she is already passed a stone, this is not a necessity for urologic intervention per them.  Will send her home with 14 days of Bactrim, give her 1 dose of IV Rocephin here in the ER.  Discussed close follow-up with primary care or urology to make sure that the hydronephrosis has resolved.  Discussed tricked return precautions.        Procedure  Procedures     Maribell Hinkle PA-C  08/27/24 3448

## 2024-08-27 NOTE — ED TRIAGE NOTES
Presents with lower back pain since yesterday and N/V. States she was at urgent care and was told her urine is infected and she may need a ct or US

## 2024-08-27 NOTE — Clinical Note
Lorraine Patterson was seen and treated in our emergency department on 8/27/2024.  She may return to work on 08/30/2024.       If you have any questions or concerns, please don't hesitate to call.      Maribell Hinkle PA-C

## 2024-08-28 LAB
HOLD SPECIMEN: NORMAL
LABORATORY COMMENT REPORT: NORMAL
PATH REPORT.FINAL DX SPEC: NORMAL
PATH REPORT.GROSS SPEC: NORMAL
PATH REPORT.RELEVANT HX SPEC: NORMAL
PATH REPORT.TOTAL CANCER: NORMAL

## 2024-08-28 NOTE — DISCHARGE INSTRUCTIONS
Primary care #: [phone]  When to come back: Nausea vomiting where you are unable to take your pills, continued symptoms after 3 to 4 days of antibiotic use, and breakable fevers, worsening symptoms

## 2024-08-29 LAB — BACTERIA UR CULT: ABNORMAL

## 2024-08-31 ENCOUNTER — TELEPHONE (OUTPATIENT)
Dept: PHARMACY | Facility: HOSPITAL | Age: 34
End: 2024-08-31
Payer: COMMERCIAL

## 2024-08-31 NOTE — PROGRESS NOTES
EDPD Note: Antibiotics Reviewed and Warranted    Contacted Mr./Mrs./Ms. Lorraine LIRIANO Carolina regarding a positive urine culture/result that was taken during their recent emergency room visit. Patient returned our phone call today. I completed education with patient. The patient is being treated appropriately with Bactrim -160 mg twice daily however the duration of treatment is longer than recommended at 13 days. Informed patient she can reduce the length of therapy to 7 days.    Susceptibility data from last 90 days.  Collected Specimen Info Organism Ampicillin Cefazolin Cefazolin (uncomplicated UTIs only) Ciprofloxacin Gentamicin Nitrofurantoin Piperacillin/Tazobactam Trimethoprim/Sulfamethoxazole   08/27/24 Urine from Clean Catch/Voided Escherichia coli  S  S  S  S  S  S  S  S      No further follow up needed from EDPD Team.     Dain Jenkins, BelénD

## 2024-09-04 ENCOUNTER — APPOINTMENT (OUTPATIENT)
Dept: UROLOGY | Facility: HOSPITAL | Age: 34
End: 2024-09-04
Payer: COMMERCIAL

## 2024-09-25 ENCOUNTER — APPOINTMENT (OUTPATIENT)
Dept: LAB | Facility: LAB | Age: 34
End: 2024-09-25
Payer: COMMERCIAL

## 2024-09-25 ENCOUNTER — OFFICE VISIT (OUTPATIENT)
Dept: UROLOGY | Facility: HOSPITAL | Age: 34
End: 2024-09-25
Payer: COMMERCIAL

## 2024-09-25 DIAGNOSIS — N13.30 HYDRONEPHROSIS, UNSPECIFIED HYDRONEPHROSIS TYPE: Primary | ICD-10-CM

## 2024-09-25 PROCEDURE — 99214 OFFICE O/P EST MOD 30 MIN: CPT | Performed by: STUDENT IN AN ORGANIZED HEALTH CARE EDUCATION/TRAINING PROGRAM

## 2024-09-25 PROCEDURE — 99204 OFFICE O/P NEW MOD 45 MIN: CPT | Performed by: STUDENT IN AN ORGANIZED HEALTH CARE EDUCATION/TRAINING PROGRAM

## 2024-09-25 PROCEDURE — 87086 URINE CULTURE/COLONY COUNT: CPT | Performed by: STUDENT IN AN ORGANIZED HEALTH CARE EDUCATION/TRAINING PROGRAM

## 2024-09-25 NOTE — PROGRESS NOTES
Has Your Skin Lesion Been Treated?: not been treated Subjective   Patient ID: Lorraine Patterson is a 34 y.o. female    HPI  34 y.o. female who presents for a follow-up visit after being referred from the ER. The patient reports experiencing pain on the left side and an inability to keep anything down, which led to the ER visit. A CT scan performed at the ER showed mild left hydronephrosis without evidence of a current stone. The patient was informed that the hydronephrosis could be due to a recently passed kidney stone or a possible infection. The patient recalls having some symptoms of a urinary tract infection (UTI) and was prescribed antibiotics, though the specific names are not remembered. The patient will undergo a renal ultrasound in three months to ensure the swelling has resolved and will provide a urine sample today for culture to confirm the infection has cleared.    The most recent CT abdomen pelvis w IV contrast, conducted on 8/27/2024, revealed:  1. Mild left hydroureteronephrosis of the proximal portion of the  collecting system. No evidence of nephroureterolithiasis. This  finding could represent a recently passed left renal calculi. Please  note that evaluation for small renal stones is limited on contrast  examination.  2. Circular ring-like structure within the vagina. Recommend clinical  correlation for intravaginal menstrual product or contraceptive  device.           Review of Systems    All systems were reviewed. Anything negative was noted in the HPI.    Objective   Physical Exam    General: Well developed, well nourished, alert and cooperative, appears in no acute distress   Eyes: Non-injected conjunctiva, sclera clear, no proptosis   Cardiac: Extremities are warm and well perfused. No edema, cyanosis or pallor   Lungs: Breathing is easy, non-labored. Speaking in clear and complete sentences. Normal diaphragmatic movement   MSK: Ambulatory with steady gait, unassisted   Neuro: Alert and oriented to person, place, and time   Psych: Demonstrates  Is This A New Presentation, Or A Follow-Up?: Skin Lesions good judgment and reason, without hallucinations, abnormal affect or abnormal behaviors   Skin: No obvious lesions, no rashes       No CVA tenderness bilaterally   No suprapubic pain or discomfort       Past Medical History:   Diagnosis Date    Cervical dysplasia     cin3 sp LEEP June 2023    Seasonal allergies     Situational anxiety     related to 2nd trimester loss, sees therapist         Past Surgical History:   Procedure Laterality Date    CERVICAL BIOPSY  W/ LOOP ELECTRODE EXCISION  06/2023    CIN3           Assessment/Plan   Mild left hydronephrosis    34 y.o. female who presents for the above condition, We had a very long and extensive discussion with the patient regarding the pathophysiology, differential diagnosis, risk factor, management, natural history, incidence and diagnostic work-up of the condition.      Plan:  - Follow up in 3 months with Renal US        9/25/2024    Scribe Attestation  By signing my name below, I, Danni Lam   attest that this documentation has been prepared under the direction and in the presence of Dr. Cecil Diez

## 2024-09-26 LAB — BACTERIA UR CULT: NORMAL

## 2024-12-16 ENCOUNTER — CONSULT (OUTPATIENT)
Dept: ENDOCRINOLOGY | Facility: CLINIC | Age: 34
End: 2024-12-16
Payer: COMMERCIAL

## 2024-12-16 DIAGNOSIS — Z31.62 ENCOUNTER FOR FERTILITY PRESERVATION COUNSELING: ICD-10-CM

## 2024-12-26 ENCOUNTER — HOSPITAL ENCOUNTER (OUTPATIENT)
Dept: RADIOLOGY | Facility: CLINIC | Age: 34
Discharge: HOME | End: 2024-12-26
Payer: COMMERCIAL

## 2024-12-26 DIAGNOSIS — N13.30 HYDRONEPHROSIS, UNSPECIFIED HYDRONEPHROSIS TYPE: ICD-10-CM

## 2024-12-26 PROCEDURE — 76770 US EXAM ABDO BACK WALL COMP: CPT

## 2025-01-06 NOTE — PROGRESS NOTES
Subjective   Patient ID: Lorraine Patterson is a 34 y.o. female    HPI  34 y.o. female who presents for a 3 month follow-up visit after being referred from the ER. The patient reports experiencing pain on the left side and an inability to keep anything down, which led to the ER visit. A CT scan performed at the ER showed mild left hydronephrosis without evidence of a current stone. The patient was informed that the hydronephrosis could be due to a recently passed kidney stone or a possible infection. The patient recalls having some symptoms of a urinary tract infection (UTI) and was prescribed antibiotics, though the specific names are not remembered. The patient will undergo a renal ultrasound in three months to ensure the swelling has resolved and will provide a urine sample today for culture to confirm the infection has cleared.    Today she denies any urinary symptoms such as hematuria, pain, burning, and infections.    Father had renal failure.    The most recent Renal US, conducted on 12/26/2024, revealed:  1. Unremarkable renal ultrasound with no evidence of hydronephrosis  in the left kidney on today's examination.       Review of Systems    All systems were reviewed. Anything negative was noted in the HPI.    Objective   Physical Exam    General: Well developed, well nourished, alert and cooperative, appears in no acute distress   Eyes: Non-injected conjunctiva, sclera clear, no proptosis   Cardiac: Extremities are warm and well perfused. No edema, cyanosis or pallor   Lungs: Breathing is easy, non-labored. Speaking in clear and complete sentences. Normal diaphragmatic movement   MSK: Ambulatory with steady gait, unassisted   Neuro: Alert and oriented to person, place, and time   Psych: Demonstrates good judgment and reason, without hallucinations, abnormal affect or abnormal behaviors   Skin: No obvious lesions, no rashes       No CVA tenderness bilaterally   No suprapubic pain or discomfort       Past  Medical History:   Diagnosis Date    Cervical dysplasia     cin3 sp LEEP June 2023    Seasonal allergies     Situational anxiety     related to 2nd trimester loss, sees therapist         Past Surgical History:   Procedure Laterality Date    CERVICAL BIOPSY  W/ LOOP ELECTRODE EXCISION  06/2023    CIN3         Assessment/Plan   Hydronephrosis left kidney    34 y.o. female who presents for the above condition, We had a very long and extensive discussion with the patient regarding the pathophysiology, differential diagnosis, risk factor, management, natural history, incidence and diagnostic work-up of the condition.      Plan:  - Follow-up in 1 year with Renal US      E&M visit today is associated with current or anticipated ongoing medical care services related to a patient's single, serious condition or a complex condition.     1/7/2025    Scribe Attestation  By signing my name below, I, Danni Dixon attest that this documentation has been prepared under the direction and in the presence of Dr. Cecil Diez.

## 2025-01-07 ENCOUNTER — OFFICE VISIT (OUTPATIENT)
Dept: UROLOGY | Facility: HOSPITAL | Age: 35
End: 2025-01-07
Payer: COMMERCIAL

## 2025-01-07 DIAGNOSIS — N13.30 HYDRONEPHROSIS OF LEFT KIDNEY: Primary | ICD-10-CM

## 2025-01-07 PROCEDURE — G2211 COMPLEX E/M VISIT ADD ON: HCPCS | Performed by: STUDENT IN AN ORGANIZED HEALTH CARE EDUCATION/TRAINING PROGRAM

## 2025-01-07 PROCEDURE — 99214 OFFICE O/P EST MOD 30 MIN: CPT | Performed by: STUDENT IN AN ORGANIZED HEALTH CARE EDUCATION/TRAINING PROGRAM

## 2025-01-09 ENCOUNTER — CONSULT (OUTPATIENT)
Dept: ENDOCRINOLOGY | Facility: CLINIC | Age: 35
End: 2025-01-09
Payer: COMMERCIAL

## 2025-01-09 VITALS
SYSTOLIC BLOOD PRESSURE: 134 MMHG | HEART RATE: 72 BPM | DIASTOLIC BLOOD PRESSURE: 81 MMHG | HEIGHT: 60 IN | WEIGHT: 194.13 LBS | BODY MASS INDEX: 38.11 KG/M2

## 2025-01-09 DIAGNOSIS — N96 RECURRENT PREGNANCY LOSS WITHOUT CURRENT PREGNANCY: ICD-10-CM

## 2025-01-09 DIAGNOSIS — Z31.41 FERTILITY TESTING: Primary | ICD-10-CM

## 2025-01-09 DIAGNOSIS — Z01.812 ENCOUNTER FOR PREPROCEDURAL LABORATORY EXAMINATION: ICD-10-CM

## 2025-01-09 PROCEDURE — 99214 OFFICE O/P EST MOD 30 MIN: CPT | Performed by: OBSTETRICS & GYNECOLOGY

## 2025-01-09 RX ORDER — DOXYCYCLINE 100 MG/1
100 CAPSULE ORAL 2 TIMES DAILY
Qty: 10 CAPSULE | Refills: 0 | Status: SHIPPED | OUTPATIENT
Start: 2025-01-09 | End: 2025-01-14

## 2025-01-09 ASSESSMENT — PATIENT HEALTH QUESTIONNAIRE - PHQ9
2. FEELING DOWN, DEPRESSED OR HOPELESS: NOT AT ALL
1. LITTLE INTEREST OR PLEASURE IN DOING THINGS: NOT AT ALL
SUM OF ALL RESPONSES TO PHQ9 QUESTIONS 1 AND 2: 0

## 2025-01-09 ASSESSMENT — PAIN SCALES - GENERAL: PAINLEVEL_OUTOF10: 0-NO PAIN

## 2025-01-09 NOTE — PROGRESS NOTES
Visit Type: In Person    NEW FERTILITY PRESERVATION VISIT    Referred by: Dr Correa  Accompanied today by: patient      Lorraine Patterson is a 34 y.o.  female who presents for fertility assessment.    She delivered her first pregnancy at 17 weeks (cervical insufficiency?). She then had one SAB and on IAB after that. She has been tested several times for PCOS but has not been given the diagnosis .  She has had irregular periods, that seem to fluctuate with her weight. She stops cycling when she is > 200 lbs. She has lost 44 lbs through intermittent fasting and calorie counting. There is DM in her family and she was told she was pre-DM before weight loss.   She did not have an easy time conceiving in the past.   She notes some chin hairs. No breast or belly hair.   She had an STD in  -  (has not had any tube testing)  Her grandmother had thyroid issues   Currently taking Nuva Ring and this plus weight loss has regular weight loss  2 children of male partner- no fertility issues    PRIOR EVALUATION / TREATMENT    Prior Labs  Lab Results    Date Done      AMH: No results found for requested labs within last 1825 days. No results found for requested labs within last 1825 days.   TSH: No results found for requested labs within last 1825 days. No results found for requested labs within last 1825 days.   PRL: No results found for requested labs within last 1825 days. No results found for requested labs within last 1825 days.   Testosterone: No results found for requested labs within last 1825 days. No results found for requested labs within last 1825 days.   DHEAS: No results found for requested labs within last 1825 days. No results found for requested labs within last 1825 days.   FSH: No results found for requested labs within last 1825 days. No results found for requested labs within last 1825 days.   17 OHP: No results found for requested labs within last 1825 days. No results found for requested labs  "within last 1825 days.   HgbA1c: 5.5 (Ref range: see below %) 2024   Hepatitis B surface antigen: Nonreactive (Ref range: Nonreactive) 2024   Hepatitis C antibody: Nonreactive (Ref range: Nonreactive) 2024   HIV ½ Antigen Antibody screen with reflex: Nonreactive (Ref range: Nonreactive) 2024   Syphilis screening with reflex: No results found for requested labs within last 1825 days. 2024   GC: Negative (Ref range: Negative) 2024   CT: Negative (Ref range: Negative) 2024   Type and Screen: No results found for requested labs within last 1825 days. No results found for requested labs within last 1825 days.   Rh: No results found for requested labs within last 1825 days. No results found for requested labs within last 1825 days.   Antibody: No results found for requested labs within last 1825 days. No results found for requested labs within last 1825 days.   Rubella: No results found for requested labs within last 1825 days. No results found for requested labs within last 1825 days.   Varicella: No results found for requested labs within last 1825 days. No results found for requested labs within last 1825 days.   Hemoglobin: No results found for requested labs within last 1825 days. No results found for requested labs within last 1825 days.   Hematocrit: No results found for requested labs within last 1825 days. No results found for requested labs within last 1825 days.   Creatinine: 0.70 (Ref range: 0.50 - 1.05 mg/dL) 2024   AST:16 (Ref range: 9 - 39 U/L) 2024   ALT:22 (Ref range: 7 - 45 U/L): 2024          Relationship Status:  partner, not ready to conceive currently      OB Hx     OB History          3    Para   1    Term                AB   2    Living             SAB   1    IAB   1    Ectopic        Multiple        Live Births   1                 GYN HISTORY    History of STI or PID: Yes, GC  Last pap smear:  No results found for: \"PAP\"  History of " abnormal paps:  Yes - working with Dr Correa  History of abnormal mammogram:  No  Pain with intercourse, bowel movements or full bladder: No     Pelvic pain: No    MENSTRUAL HISTORY:   Cycle length:  Irregular, has gone 6 months and then has had up to 2 months of bleeding  Flow :  Average    Dysmenorrhea:      ENDOCRINE HISTORY  Nipple Discharge: No  Vision changes: No  Headaches: No  Excess hair growth: Yes  Acne: No  Oily skin:Yes  Recent weight change: Yes (has lost 44 pounds)  Significant exercise history: No    PMH  Past Medical History:   Diagnosis Date    Cervical dysplasia     cin3 sp LEEP June 2023    Seasonal allergies     Situational anxiety     related to 2nd trimester loss, sees therapist        MEDICATIONS  Current Outpatient Medications on File Prior to Visit   Medication Sig Dispense Refill    etonogestreL-ethinyl estradioL (Nuvaring) 0.12-0.015 mg/24 hr vaginal ring Insert vaginal ring every 3 weeks to suppress menstruation. 4 each 4    ketorolac (Toradol) 10 mg tablet Take 1 tablet (10 mg) by mouth every 8 hours if needed for moderate pain (4 - 6). 15 tablet 0    metoclopramide (Reglan) 10 mg tablet Take 1 tablet (10 mg) by mouth every 8 hours if needed (nausea). 20 tablet 0     No current facility-administered medications on file prior to visit.       PSH  Past Surgical History:   Procedure Laterality Date    CERVICAL BIOPSY  W/ LOOP ELECTRODE EXCISION  06/2023    CIN3        PSYCH HISTORY  Past psych history: Depression following her pregnancy loss, generalized anxiety disorder  Prior hospitalization for mental health disorder: No     SOCIAL HISTORY  Social History     Tobacco Use    Smoking status: Never     Passive exposure: Never    Smokeless tobacco: Never   Substance Use Topics    Alcohol use: Never    Drug use: Never     Occupation: behavioral health  Toxic Habits: None  History of incarceration: No  History of domestic violence: No  History of  incest or rape: No     PARTNER HISTORY    Not  given     FAMILY HISTORY   No family history on file.  Family History of Blood Clots: None  Family history of breast, ovary, colon, endometrial cancer: No    GENETIC HISTORY  Ethnic background patient:   Ethnic background partner: Same  Genetic Disease in Family: None  Birth Defects in Family: None  Genetic screening performed previously: None     BMI:   BMI Readings from Last 1 Encounters:   25 37.91 kg/m²     VITALS:  /81   Pulse 72   Ht 1.524 m (5')   Wt 88.1 kg (194 lb 2 oz)   LMP 2024   BMI 37.91 kg/m²   LMP: Patient's last menstrual period was 2024.    ASSESSMENT   34 y.o.  female with hx RPL, suspected PCOS (pre-DM, recent successful weight loss), desires RPL assessment and poss egg freezing    Discussed poss PCOS diagnosis and strategies to prevent further losses- already is planning cerclage/MFM with Dr Correa when pregnant again    Orders Placed This Encounter   Procedures    Hysterosalpingogram (HSG)    FL hysterosalpingogram    US pelvis transvaginal    Antimullerian Hormone (Amh)    TSH with reflex to Free T4 if abnormal    Thyroid Peroxidase (TPO) Antibody    Cardiolipin Antibody    Beta-2 Glycoprotein Antibodies    Lupus Anticoag. With Interpretation[Abraham]    Prolactin    Thyroid Stimulating Immunoglobulin    POCT pregnancy, urine manually resulted     Reviewed HSG and she will call with next menses to schedule. Hx PID/GC. Will need doxycycline prior to HSG.     Not actively trying/wanting to conceive. Reviewed that, if she is not desiring pregnancy in the next 1 year, she is likely a good candidate for egg freezing. Follow up visit in 2 months to review options. Will ask  to reach out.     Intimate Exam Performed: No, an intimate exam was not performed at this encounter.     Sana Bailon  2025  1:38 PM

## 2025-01-14 ENCOUNTER — LAB (OUTPATIENT)
Dept: LAB | Facility: LAB | Age: 35
End: 2025-01-14
Payer: COMMERCIAL

## 2025-01-14 DIAGNOSIS — N96 RECURRENT PREGNANCY LOSS WITHOUT CURRENT PREGNANCY: ICD-10-CM

## 2025-01-14 DIAGNOSIS — Z31.41 FERTILITY TESTING: ICD-10-CM

## 2025-01-14 LAB
PROLACTIN SERPL-MCNC: 11.3 UG/L (ref 3–20)
THYROPEROXIDASE AB SERPL-ACNC: 32 IU/ML
TSH SERPL-ACNC: 3.56 MIU/L (ref 0.44–3.98)

## 2025-01-14 PROCEDURE — 86376 MICROSOMAL ANTIBODY EACH: CPT

## 2025-01-14 PROCEDURE — 86146 BETA-2 GLYCOPROTEIN ANTIBODY: CPT

## 2025-01-14 PROCEDURE — 85613 RUSSELL VIPER VENOM DILUTED: CPT

## 2025-01-14 PROCEDURE — 36415 COLL VENOUS BLD VENIPUNCTURE: CPT

## 2025-01-14 PROCEDURE — 85730 THROMBOPLASTIN TIME PARTIAL: CPT

## 2025-01-14 PROCEDURE — 84146 ASSAY OF PROLACTIN: CPT

## 2025-01-14 PROCEDURE — 86147 CARDIOLIPIN ANTIBODY EA IG: CPT

## 2025-01-14 PROCEDURE — 84443 ASSAY THYROID STIM HORMONE: CPT

## 2025-01-14 PROCEDURE — 83516 IMMUNOASSAY NONANTIBODY: CPT

## 2025-01-14 PROCEDURE — 84445 ASSAY OF TSI GLOBULIN: CPT

## 2025-01-15 LAB
B2 GLYCOPROT1 IGA SER-ACNC: 2.2 U/ML
B2 GLYCOPROT1 IGG SER-ACNC: <1.4 U/ML
B2 GLYCOPROT1 IGM SER-ACNC: 0.7 U/ML
CARDIOLIPIN IGA SERPL-ACNC: <0.5 APL U/ML
CARDIOLIPIN IGG SER IA-ACNC: <1.6 GPL U/ML
CARDIOLIPIN IGM SER IA-ACNC: 0.6 MPL U/ML

## 2025-01-16 LAB
DRVVT SCREEN TO CONFIRM RATIO: 0.86 RATIO
DRVVT/DRVVT CFM NRMLZD PPP-RTO: 0.94 RATIO
DRVVT/DRVVT CFM P DOAC NEUT NORM PPP-RTO: 0.91 RATIO
LA 2 SCREEN W REFLEX-IMP: NORMAL
NORMALIZED SCT PPP-RTO: 0.84 RATIO
SILICA CLOTTING TIME CONFIRMATION: 0.9 RATIO
SILICA CLOTTING TIME SCREEN: 0.76 RATIO

## 2025-01-17 LAB
MIS SERPL-MCNC: 0.38 NG/ML (ref 0.18–11.71)
THYROID STIMULATING IMMUNOGLOBULIN: <89 % BASELINE

## 2025-01-24 ENCOUNTER — ANCILLARY PROCEDURE (OUTPATIENT)
Dept: ENDOCRINOLOGY | Facility: CLINIC | Age: 35
End: 2025-01-24
Payer: COMMERCIAL

## 2025-01-24 DIAGNOSIS — N96 RECURRENT PREGNANCY LOSS WITHOUT CURRENT PREGNANCY: ICD-10-CM

## 2025-01-24 PROCEDURE — 76830 TRANSVAGINAL US NON-OB: CPT | Performed by: OBSTETRICS & GYNECOLOGY

## 2025-01-24 PROCEDURE — 76830 TRANSVAGINAL US NON-OB: CPT

## 2025-03-13 ENCOUNTER — TELEMEDICINE (OUTPATIENT)
Dept: ENDOCRINOLOGY | Facility: CLINIC | Age: 35
End: 2025-03-13
Payer: COMMERCIAL

## 2025-03-13 DIAGNOSIS — Z31.41 FERTILITY TESTING: Primary | ICD-10-CM

## 2025-03-13 NOTE — PROGRESS NOTES
Virtual or Telephone Consent: An interactive audio and video telecommunication system which permits real time communications between the patient (at the originating site) and provider (at the distant site) was utilized to provide this telehealth service    Interval history: 36yo  with hx RPL, RUDY (AMH = 0.383)   She is in the middle of 9 days of bleeding. This is even with the Nuva Ring. Which she uses continuously. She took it out for a week to give her body a rest.   She is not sure when her mom went through menopause.   She is a in a healthy relationship but they are not currently ready to try- he has two kids.     Normal pelvic US, no anomalies  Additional eval below  Component      Latest Ref Rng 2025   DRVVT Screen      RATIO 0.86    DRVVT Confirmation      RATIO 0.94    DRVVT Test Ratio      <=1.20 RATIO 0.91    SCT Screen      RATIO 0.76    SCT Confirmation      RATIO 0.90    SCT Test Ratio      <=1.16 RATIO 0.84    Lupus Anticoagulant Interpretation No evidence of lupus anticoagulant in these assays (DRVVT and Silica Clotting Time (SCT)). Assay interferences may occur in the presence of factor deficiency/inhibitor and/or anticoagulants. For patients on anti-Vitamin K therapy, repeating DRVVT testing might be indicated when the patient is off anti-vitamin K therapy. The DRVVT assay contains a heparin neutralizer up to 1.0 U/mL. Higher concentrations of heparin may cause interferences. SCT results are not affected by UF heparin up to 0.5 U/mL and LMW Heparin up to 1.0 U/mL. Higher concentrations of heparin may cause interferences. Correlation with clinical findings and clinical history is necessary to assess significance of results in an individual patient.    Anticardiolipin IgA      <20.0 APL U/mL <0.5    Anticardiolipin IgG      <20.0 GPL U/mL <1.6    Anticardiolipin IgM      <20.0 MPL U/mL 0.6    Beta-2 Glyco 1 IgG      <20.0 U/mL <1.4    Beta-2 Glyco 1 IgA      <20.0 U/mL 2.2    Beta 2  Glyco 1 IgM      <20.0 U/mL 0.7    Anti-Mullerian Hormone      0.176 - 11.705 ng/mL 0.383    Thyroid Stimulating Hormone      0.44 - 3.98 mIU/L 3.56    Thyroid Peroxidase (TPO) Antibody      <=60 IU/mL 32    PROLACTIN      3.0 - 20.0 ug/L 11.3    TSI      <140 % baseline <89          Past History Reviewed and Affirmed Below:  Visit Type: In Person    NEW FERTILITY PRESERVATION VISIT    Referred by: Dr Correa  Accompanied today by: patient      Lorraine Patterson is a 34 y.o.  female who presents for fertility assessment.    She delivered her first pregnancy at 17 weeks (cervical insufficiency?). She then had one SAB and on IAB after that. She has been tested several times for PCOS but has not been given the diagnosis .  She has had irregular periods, that seem to fluctuate with her weight. She stops cycling when she is > 200 lbs. She has lost 44 lbs through intermittent fasting and calorie counting. There is DM in her family and she was told she was pre-DM before weight loss.   She did not have an easy time conceiving in the past.   She notes some chin hairs. No breast or belly hair.   She had an STD in  - GC (has not had any tube testing)  Her grandmother had thyroid issues   Currently taking Nuva Ring and this plus weight loss has regular weight loss  2 children of male partner- no fertility issues    PRIOR EVALUATION / TREATMENT    Prior Labs  Lab Results    Date Done      AMH: 0.383 (Ref range: 0.176 - 11.705 ng/mL) 2025   TSH: 3.56 (Ref range: 0.44 - 3.98 mIU/L) 2025   PRL: 11.3 (Ref range: 3.0 - 20.0 ug/L) 2025   Testosterone: No results found for requested labs within last 1825 days. No results found for requested labs within last 1825 days.   DHEAS: No results found for requested labs within last 1825 days. No results found for requested labs within last 1825 days.   FSH: No results found for requested labs within last 1825 days. No results found for requested labs within last 1825  days.   17 OHP: No results found for requested labs within last 1825 days. No results found for requested labs within last 1825 days.   HgbA1c: 5.5 (Ref range: see below %) 2024   Hepatitis B surface antigen: Nonreactive (Ref range: Nonreactive) 2024   Hepatitis C antibody: Nonreactive (Ref range: Nonreactive) 2024   HIV ½ Antigen Antibody screen with reflex: Nonreactive (Ref range: Nonreactive) 2024   Syphilis screening with reflex: No results found for requested labs within last 1825 days. 2024   GC: Negative (Ref range: Negative) 2024   CT: Negative (Ref range: Negative) 2024   Type and Screen: No results found for requested labs within last 1825 days. No results found for requested labs within last 1825 days.   Rh: No results found for requested labs within last 1825 days. No results found for requested labs within last 1825 days.   Antibody: No results found for requested labs within last 1825 days. No results found for requested labs within last 1825 days.   Rubella: No results found for requested labs within last 1825 days. No results found for requested labs within last 1825 days.   Varicella: No results found for requested labs within last 1825 days. No results found for requested labs within last 1825 days.   Hemoglobin: No results found for requested labs within last 1825 days. No results found for requested labs within last 1825 days.   Hematocrit: No results found for requested labs within last 1825 days. No results found for requested labs within last 1825 days.   Creatinine: 0.70 (Ref range: 0.50 - 1.05 mg/dL) 2024   AST:16 (Ref range: 9 - 39 U/L) 2024   ALT:22 (Ref range: 7 - 45 U/L): 2024          Relationship Status:  partner, not ready to conceive currently      OB Hx     OB History          3    Para   1    Term                AB   2    Living             SAB   1    IAB   1    Ectopic        Multiple        Live Births   1            "      GYN HISTORY    History of STI or PID: Yes, GC  Last pap smear:  No results found for: \"PAP\"  History of abnormal paps:  Yes - working with Dr Correa  History of abnormal mammogram:  No  Pain with intercourse, bowel movements or full bladder: No     Pelvic pain: No    MENSTRUAL HISTORY:   Cycle length:  Irregular, has gone 6 months and then has had up to 2 months of bleeding  Flow :  Average    Dysmenorrhea:      ENDOCRINE HISTORY  Nipple Discharge: No  Vision changes: No  Headaches: No  Excess hair growth: Yes  Acne: No  Oily skin:Yes  Recent weight change: Yes (has lost 44 pounds)  Significant exercise history: No    PMH  Past Medical History:   Diagnosis Date    Cervical dysplasia     cin3 sp LEEP June 2023    Seasonal allergies     Situational anxiety     related to 2nd trimester loss, sees therapist        MEDICATIONS  Current Outpatient Medications on File Prior to Visit   Medication Sig Dispense Refill    etonogestreL-ethinyl estradioL (Nuvaring) 0.12-0.015 mg/24 hr vaginal ring Insert vaginal ring every 3 weeks to suppress menstruation. 4 each 4    ketorolac (Toradol) 10 mg tablet Take 1 tablet (10 mg) by mouth every 8 hours if needed for moderate pain (4 - 6). 15 tablet 0    metoclopramide (Reglan) 10 mg tablet Take 1 tablet (10 mg) by mouth every 8 hours if needed (nausea). 20 tablet 0     No current facility-administered medications on file prior to visit.       PSH  Past Surgical History:   Procedure Laterality Date    CERVICAL BIOPSY  W/ LOOP ELECTRODE EXCISION  06/2023    CIN3        PSYCH HISTORY  Past psych history: Depression following her pregnancy loss, generalized anxiety disorder  Prior hospitalization for mental health disorder: No     SOCIAL HISTORY  Social History     Tobacco Use    Smoking status: Never     Passive exposure: Never    Smokeless tobacco: Never   Substance Use Topics    Alcohol use: Never    Drug use: Never     Occupation: behavioral health  Toxic Habits: None  History of " incarceration: No  History of domestic violence: No  History of  incest or rape: No     PARTNER HISTORY    Not given     FAMILY HISTORY   No family history on file.  Family History of Blood Clots: None  Family history of breast, ovary, colon, endometrial cancer: No    GENETIC HISTORY  Ethnic background patient:   Ethnic background partner: Same  Genetic Disease in Family: None  Birth Defects in Family: None  Genetic screening performed previously: None     BMI:   BMI Readings from Last 1 Encounters:   25 37.91 kg/m²     VITALS:  There were no vitals taken for this visit.  LMP: No LMP recorded.    ASSESSMENT   34 y.o.  female with hx RPL, suspected PCOS (pre-DM, recent successful weight loss), desires RPL assessment and poss egg freezing    Discussed poss PCOS diagnosis and strategies to prevent further losses- already is planning cerclage/MFM with Dr Correa when pregnant again    No orders of the defined types were placed in this encounter.    Reviewed HSG and she will call with next menses to schedule. Hx PID/GC. Will need doxycycline prior to HSG.     Not actively trying/wanting to conceive. Reviewed that, if she is not desiring pregnancy in the next 1 year, she is likely a good candidate for egg freezing. Follow up visit in 2 months to review options. Will ask  to reach out.     Intimate Exam Performed: No, an intimate exam was not performed at this encounter.     Sana DIALLO Uvaldo  2025  1:38 PM        FOLLOW UP   Reviewed options for Nuva Ring management given AUB pattern  HSG done and reviewed  Reviewed significance of low AMH- not a great candidate for egg freezing. Reviewed challenges of obtaining sufficiency number of cryopreserved oocytes given her age of 35.   She wants to see where her relationship is this and follow amh q6-12 months. This is reasonable. Will place order for AMH in 6 months and FUV in 6 months.     Sana YOEL Bailon 25 3:11 PM

## 2025-06-06 DIAGNOSIS — Z30.015 ENCOUNTER FOR INITIAL PRESCRIPTION OF VAGINAL RING HORMONAL CONTRACEPTIVE: ICD-10-CM

## 2025-06-06 RX ORDER — ETONOGESTREL AND ETHINYL ESTRADIOL VAGINAL RING .015; .12 MG/D; MG/D
RING VAGINAL
Qty: 1 EACH | Refills: 4 | Status: SHIPPED | OUTPATIENT
Start: 2025-06-06

## 2025-08-19 ENCOUNTER — TELEMEDICINE (OUTPATIENT)
Dept: ENDOCRINOLOGY | Facility: CLINIC | Age: 35
End: 2025-08-19
Payer: COMMERCIAL

## 2025-08-19 VITALS — WEIGHT: 198 LBS | BODY MASS INDEX: 38.87 KG/M2 | HEIGHT: 60 IN

## 2025-08-19 DIAGNOSIS — E66.812 CLASS 2 OBESITY WITHOUT SERIOUS COMORBIDITY WITH BODY MASS INDEX (BMI) OF 38.0 TO 38.9 IN ADULT, UNSPECIFIED OBESITY TYPE: Primary | ICD-10-CM

## 2025-08-19 PROCEDURE — 3008F BODY MASS INDEX DOCD: CPT | Performed by: NURSE PRACTITIONER

## 2025-08-19 PROCEDURE — 99204 OFFICE O/P NEW MOD 45 MIN: CPT | Performed by: NURSE PRACTITIONER

## 2025-08-19 PROCEDURE — 1036F TOBACCO NON-USER: CPT | Performed by: NURSE PRACTITIONER

## 2025-08-19 ASSESSMENT — ENCOUNTER SYMPTOMS
LOSS OF SENSATION IN FEET: 0
NUMBNESS: 0
POLYPHAGIA: 0
WHEEZING: 0
NERVOUS/ANXIOUS: 0
POLYDIPSIA: 0
FREQUENCY: 0
CONSTIPATION: 0
SHORTNESS OF BREATH: 0
NAUSEA: 0
PALPITATIONS: 0
DYSPHORIC MOOD: 0
DEPRESSION: 0
FATIGUE: 0
ARTHRALGIAS: 0
OCCASIONAL FEELINGS OF UNSTEADINESS: 0

## 2025-08-19 ASSESSMENT — PAIN SCALES - GENERAL: PAINLEVEL_OUTOF10: 0-NO PAIN

## 2025-09-04 ENCOUNTER — TELEMEDICINE (OUTPATIENT)
Dept: ENDOCRINOLOGY | Facility: CLINIC | Age: 35
End: 2025-09-04
Payer: COMMERCIAL

## 2025-09-04 DIAGNOSIS — Z31.41 FERTILITY TESTING: ICD-10-CM

## 2025-09-04 DIAGNOSIS — N96 RECURRENT PREGNANCY LOSS WITHOUT CURRENT PREGNANCY: ICD-10-CM

## 2025-09-04 DIAGNOSIS — N83.8 DIMINISHED OVARIAN RESERVE: ICD-10-CM

## 2025-09-04 DIAGNOSIS — Z87.42 HISTORY OF ACUTE PID: Primary | ICD-10-CM

## 2025-09-04 DIAGNOSIS — Z13.1 SCREENING FOR DIABETES MELLITUS: ICD-10-CM

## 2025-09-04 PROCEDURE — 99214 OFFICE O/P EST MOD 30 MIN: CPT | Performed by: OBSTETRICS & GYNECOLOGY

## 2025-09-04 RX ORDER — DOXYCYCLINE 100 MG/1
CAPSULE ORAL
Qty: 10 CAPSULE | Refills: 0 | Status: SHIPPED | OUTPATIENT
Start: 2025-09-04

## 2025-09-10 ENCOUNTER — APPOINTMENT (OUTPATIENT)
Dept: ENDOCRINOLOGY | Facility: CLINIC | Age: 35
End: 2025-09-10
Payer: COMMERCIAL

## 2025-09-23 ENCOUNTER — APPOINTMENT (OUTPATIENT)
Dept: ENDOCRINOLOGY | Facility: CLINIC | Age: 35
End: 2025-09-23
Payer: COMMERCIAL